# Patient Record
Sex: FEMALE | Race: ASIAN | NOT HISPANIC OR LATINO
[De-identification: names, ages, dates, MRNs, and addresses within clinical notes are randomized per-mention and may not be internally consistent; named-entity substitution may affect disease eponyms.]

---

## 2019-05-10 ENCOUNTER — RESULT REVIEW (OUTPATIENT)
Age: 29
End: 2019-05-10

## 2021-07-26 ENCOUNTER — EMERGENCY (EMERGENCY)
Facility: HOSPITAL | Age: 31
LOS: 1 days | Discharge: HOME | End: 2021-07-26
Attending: EMERGENCY MEDICINE | Admitting: EMERGENCY MEDICINE
Payer: COMMERCIAL

## 2021-07-26 VITALS
TEMPERATURE: 98 F | RESPIRATION RATE: 18 BRPM | OXYGEN SATURATION: 98 % | HEART RATE: 85 BPM | DIASTOLIC BLOOD PRESSURE: 60 MMHG | SYSTOLIC BLOOD PRESSURE: 130 MMHG

## 2021-07-26 PROCEDURE — 99284 EMERGENCY DEPT VISIT MOD MDM: CPT

## 2021-07-26 PROCEDURE — 76830 TRANSVAGINAL US NON-OB: CPT | Mod: 26

## 2021-07-26 NOTE — ED ADULT TRIAGE NOTE - CHIEF COMPLAINT QUOTE
p/t 13 weeks pregnant, with normal IUP by PMD c/o of vag spotting since yesterday denies any other discomfort

## 2021-07-26 NOTE — ED PROVIDER NOTE - CLINICAL SUMMARY MEDICAL DECISION MAKING FREE TEXT BOX
Pt with known IUP @ 13 weeks p/w spotting, HDS, abd s/nt, patient states her blood type is +. Plan for TVUS to eval pregnancy and likely dispo with outpt f/u

## 2021-07-26 NOTE — ED PROVIDER NOTE - PHYSICAL EXAMINATION
GEN - NAD; well appearing; A+O x3   HEAD - NC/AT   EYES- PERRL, EOMI  ENT: Airway patent, mmm  NECK: Neck supple  PULMONARY - CTA b/l, symmetric breath sounds.   CARDIAC -s1s2, RRR, no M,G,R  ABDOMEN - +BS, ND, NT, soft, no guarding, no rebound, no masses   BACK - no CVA tenderness, Normal  spine   NEUROLOGIC - alert, speech clear, moving all four extremities, normal gait   PSYCH -nl mood/affect, nl insight.

## 2021-07-26 NOTE — ED PROVIDER NOTE - NSFOLLOWUPINSTRUCTIONS_ED_ALL_ED_FT
Please follow up with your gynecologist. Return to the emergency department if you have new or worsening symptoms.

## 2021-07-26 NOTE — ED ADULT NURSE NOTE - OBJECTIVE STATEMENT
Pt received to intake presents about 13 weeks pregnant with vaginal spotting, awaiting ultrasound. Pt currently in NAD, a&ox4, ambulatory at baseline, will continue to monitor.

## 2021-07-26 NOTE — ED PROVIDER NOTE - OBJECTIVE STATEMENT
Otherwise healthy female with known IUP ~13 weeks p/w vaginal spotting since yesterday. Pt states she has a known ?subchor hematoma that had resolved, bleeding started after having sex yesterday. Currently having brown spotting. No fatigue, sob, dizziness, abd pain.

## 2021-07-26 NOTE — ED PROVIDER NOTE - PATIENT PORTAL LINK FT
You can access the FollowMyHealth Patient Portal offered by [facility] by registering at the following website: [email or web address]. By joining [facility]’s FollowMyHealth portal, you will also be able to view your health information using other applications (apps) compatible with our system.

## 2022-05-03 ENCOUNTER — APPOINTMENT (OUTPATIENT)
Dept: ORTHOPEDIC SURGERY | Facility: CLINIC | Age: 32
End: 2022-05-03
Payer: COMMERCIAL

## 2022-05-03 VITALS — HEIGHT: 61 IN | WEIGHT: 155 LBS | BODY MASS INDEX: 29.27 KG/M2

## 2022-05-03 DIAGNOSIS — Z78.9 OTHER SPECIFIED HEALTH STATUS: ICD-10-CM

## 2022-05-03 DIAGNOSIS — M72.2 PLANTAR FASCIAL FIBROMATOSIS: ICD-10-CM

## 2022-05-03 DIAGNOSIS — M54.50 LOW BACK PAIN, UNSPECIFIED: ICD-10-CM

## 2022-05-03 PROBLEM — Z00.00 ENCOUNTER FOR PREVENTIVE HEALTH EXAMINATION: Status: ACTIVE | Noted: 2022-05-03

## 2022-05-03 PROCEDURE — 73610 X-RAY EXAM OF ANKLE: CPT | Mod: LT

## 2022-05-03 PROCEDURE — 99204 OFFICE O/P NEW MOD 45 MIN: CPT | Mod: 25

## 2022-05-03 PROCEDURE — 20551 NJX 1 TENDON ORIGIN/INSJ: CPT

## 2022-05-03 PROCEDURE — 72080 X-RAY EXAM THORACOLMB 2/> VW: CPT

## 2022-05-03 NOTE — PHYSICAL EXAM
[Bilateral] : rib bilaterally [No bony abnormalities] : No bony abnormalities [NL (40)] : plantar flexion 40 degrees [NL 30)] : inversion 30 degrees [NL (20)] : eversion 20 degrees [5___] : [facility] 5[unfilled]/5 [Left] : left ankle [There are no fractures, subluxations or dislocations. No significant abnormalities are seen] : There are no fractures, subluxations or dislocations. No significant abnormalities are seen [] : no swelling

## 2022-05-03 NOTE — PROCEDURE
[Tendon Origin] : tendon origin [Left] : of the left [Plantar Fascia] : plantar fascia [Pain] : pain [Alcohol] : alcohol [Betadine] : betadine [___ cc    3mg] :  Betamethasone (Celestone) ~Vcc of 3mg [___ cc    1%] : Lidocaine ~Vcc of 1%

## 2022-05-03 NOTE — DISCUSSION/SUMMARY
[de-identified] : Back stretches/core strengthening discussed for back, achilles and arch stretches for heel, gel cushion for shoe

## 2022-05-03 NOTE — HISTORY OF PRESENT ILLNESS
[Sudden] : sudden [8] : 8 [7] : 7 [Dull/Aching] : dull/aching [Shooting] : shooting [Stabbing] : stabbing [Intermittent] : intermittent [Meds] : meds [Sitting] : sitting [de-identified] : Had a baby 12/31/21, has had back pain issues prior, but since seems to have worsened. No pain down legs. No bowel or bladder changes. Also has left heel pain. Worse on taking first few steps after she has been sitting [] : no [FreeTextEntry3] : 1/22 [FreeTextEntry5] : Pt present with back and left heel pain. She reported that she gave birth in January 2022 and has been having the pain since. [FreeTextEntry9] : ibuprofen

## 2023-09-26 NOTE — ED ADULT NURSE NOTE - NS ED NURSE RECORD ANOTHER HT AND WT
Yes Elidel Pregnancy And Lactation Text: This medication is Pregnancy Category C. It is unknown if this medication is excreted in breast milk.